# Patient Record
Sex: MALE | Race: NATIVE HAWAIIAN OR OTHER PACIFIC ISLANDER | Employment: OTHER | URBAN - NONMETROPOLITAN AREA
[De-identification: names, ages, dates, MRNs, and addresses within clinical notes are randomized per-mention and may not be internally consistent; named-entity substitution may affect disease eponyms.]

---

## 2020-01-24 LAB
PARTIAL THROMBOPLASTIN TIME: 37.6 SECONDS (ref 24.49–30.3)
TROPONIN I: 15.85 NG/ML (ref 0.01–0.04)

## 2020-01-30 ENCOUNTER — TELEPHONE (OUTPATIENT)
Dept: CARDIOLOGY CLINIC | Age: 85
End: 2020-01-30

## 2020-01-30 NOTE — TELEPHONE ENCOUNTER
Pt is coming to [facility] TAL office 2/3 to see Dr. Sravan Ca. [facility] nurse report:    Weights:  1/28 - 240 lb   1/30 - 261 lb    /78, HR 71 today  Lung and heart sounds normal.  No respiratory distress noted. Small amt geena ankle edema. This report is from [facility].   I did question her about how the weights were obtained and she claims no changes in conditions, pt standing, not leaning, etc.    [phone]

## 2020-02-03 ENCOUNTER — OFFICE VISIT (OUTPATIENT)
Dept: CARDIOLOGY CLINIC | Age: 85
End: 2020-02-03
Payer: MEDICARE

## 2020-02-03 VITALS
HEART RATE: 66 BPM | DIASTOLIC BLOOD PRESSURE: 42 MMHG | BODY MASS INDEX: 39.56 KG/M2 | SYSTOLIC BLOOD PRESSURE: 106 MMHG | HEIGHT: 68 IN | WEIGHT: 261 LBS

## 2020-02-03 PROCEDURE — 4040F PNEUMOC VAC/ADMIN/RCVD: CPT | Performed by: INTERNAL MEDICINE

## 2020-02-03 PROCEDURE — G8484 FLU IMMUNIZE NO ADMIN: HCPCS | Performed by: INTERNAL MEDICINE

## 2020-02-03 PROCEDURE — G8417 CALC BMI ABV UP PARAM F/U: HCPCS | Performed by: INTERNAL MEDICINE

## 2020-02-03 PROCEDURE — 99214 OFFICE O/P EST MOD 30 MIN: CPT | Performed by: INTERNAL MEDICINE

## 2020-02-03 PROCEDURE — 1123F ACP DISCUSS/DSCN MKR DOCD: CPT | Performed by: INTERNAL MEDICINE

## 2020-02-03 PROCEDURE — G8428 CUR MEDS NOT DOCUMENT: HCPCS | Performed by: INTERNAL MEDICINE

## 2020-02-03 PROCEDURE — 1036F TOBACCO NON-USER: CPT | Performed by: INTERNAL MEDICINE

## 2020-02-03 RX ORDER — FUROSEMIDE 40 MG/1
40 TABLET ORAL DAILY
COMMUNITY

## 2020-02-03 RX ORDER — CLOPIDOGREL BISULFATE 75 MG/1
75 TABLET ORAL DAILY
COMMUNITY

## 2020-02-03 NOTE — PROGRESS NOTES
Today's Date: 2/3/2020  Patient Name: Fredrick Brito  Patient's age: 80 y.o., 7/26/1929          The patient is a 80 y.o.  male is in the office for f/u, he was hospitalized recently with hematuria, and had transient bradycardia after a BX. He required transfusion. He also had acute HFrEF. He has WAN and 1-2 episodes of chest tightness. He had an echo on 1/23/20 which showed LVEF 20-25% with PH. Past Medical History:   has a past medical history of BPH (benign prostatic hypertrophy), CAD (coronary artery disease), Chronic renal disease, DM (diabetes mellitus) ([facility] [address] Hematuria, Hyperlipidemia, Hypertension, Obesity, Orthostatic hypotension, and Urinary retention. Past Surgical History:   has a past surgical history that includes Coronary artery bypass graft; eye surgery; Cysto with Biopsy (without Fulguration); Colonoscopy; and Tonsillectomy. Home Medications:    Prior to Admission medications    Medication Sig Start Date End Date Taking? Authorizing Provider   clopidogrel (PLAVIX) 75 MG tablet Take 75 mg by mouth daily   Yes Historical Provider, MD   furosemide (LASIX) 40 MG tablet Take 40 mg by mouth daily   Yes Historical Provider, MD   Multiple Vitamins-Minerals (MULTIVITAMIN ADULTS PO) Take 1 tablet by mouth daily   Yes Historical Provider, MD   Insulin Degludec (TRESIBA FLEXTOUCH) 100 UNIT/ML SOPN Inject into the skin   Yes Historical Provider, MD   CRESTOR 10 MG tablet Take 1 tablet by mouth daily 8/4/16  Yes Historical Provider, MD   NOVOLOG 100 UNIT/ML injection vial  6/8/16  Yes Historical Provider, MD   aspirin 81 MG chewable tablet Take 81 mg by mouth daily. Yes Historical Provider, MD   isosorbide mononitrate (IMDUR) 30 MG CR tablet Take 30 mg by mouth daily. Yes Historical Provider, MD   losartan-hydrochlorothiazide (HYZAAR) 100-12.5 MG per tablet Take 1 tablet by mouth daily.    Yes Historical Provider, MD   metoprolol (LOPRESSOR) 50 MG tablet Take 50 mg No results for input(s): PROTIME, INR in the last 72 hours. FASTING LIPID PANEL:No results found for: HDL, LDLDIRECT, LDLCALC, TRIG  LIVER PROFILE:No results for input(s): AST, ALT, LABALBU in the last 72 hours.     Assessment:    Recent admission with hematuria, anemia, with bradycardia after BX  Type II MI VS NSTEMI, recent  Chronic HFrEF  MVCAD S/P CABG X5 in 1995  CKD  Bladder tumor  Mild-moderate MR/TR  PH  Obesity  Patient Active Problem List   Diagnosis    CKD (chronic kidney disease) stage 3, GFR 30-59 ml/min ([facility])    HTN (hypertension)    DM2 (diabetes mellitus, type 2) ([facility] [address]    Hx of CABG    BPH (benign prostatic hyperplasia)    Abnormal nuclear stress test       Recommendations:  Long D/W pt and daughter regarding conservative RX VS cardiac cath  He does not want to be on dialysis so will hold off  Fluid restriction  Treat Anemia  Continue BB/ARB/LASIX/STATIN/PRN NTG  WILL DC PLAVIX IF HEMATURIA RECURS   RTC Ul. Angel Mcdonald, MD  [facility] Consult           [phone]